# Patient Record
(demographics unavailable — no encounter records)

---

## 2024-10-20 NOTE — HISTORY OF PRESENT ILLNESS
[FreeTextEntry1] : Patient returns to the office for management regarding painful fibroadenoma left breast  Options including excision or observation were reviewed with the patient  The patient would strongly like to undergo excisional biopsy because of the degree of pain

## 2024-10-20 NOTE — ASSESSMENT
[FreeTextEntry1] : Left breast mass  Care plan reviewed with patient and mother  Patient would like to go to undergo excisional biopsy  The relative risks and benefits were discussed  A total of 45 minutes was spent in consultation

## 2024-11-14 NOTE — HISTORY OF PRESENT ILLNESS
[FreeTextEntry1] : Patient had left breast lumpectomy and final pathology reported as a fibroadenoma.  Patient is feeling well in office today.